# Patient Record
Sex: FEMALE | Race: WHITE | NOT HISPANIC OR LATINO | URBAN - METROPOLITAN AREA
[De-identification: names, ages, dates, MRNs, and addresses within clinical notes are randomized per-mention and may not be internally consistent; named-entity substitution may affect disease eponyms.]

---

## 2023-11-13 ENCOUNTER — HOSPITAL ENCOUNTER (EMERGENCY)
Facility: HOSPITAL | Age: 68
Discharge: HOME | End: 2023-11-13
Attending: EMERGENCY MEDICINE
Payer: MEDICARE

## 2023-11-13 ENCOUNTER — APPOINTMENT (OUTPATIENT)
Dept: RADIOLOGY | Facility: HOSPITAL | Age: 68
End: 2023-11-13
Payer: MEDICARE

## 2023-11-13 VITALS
RESPIRATION RATE: 20 BRPM | BODY MASS INDEX: 27.49 KG/M2 | SYSTOLIC BLOOD PRESSURE: 116 MMHG | OXYGEN SATURATION: 98 % | HEIGHT: 65 IN | HEART RATE: 68 BPM | DIASTOLIC BLOOD PRESSURE: 76 MMHG | WEIGHT: 165 LBS | TEMPERATURE: 98.2 F

## 2023-11-13 DIAGNOSIS — S49.92XA INJURY OF LEFT SHOULDER, INITIAL ENCOUNTER: Primary | ICD-10-CM

## 2023-11-13 PROCEDURE — 2500000004 HC RX 250 GENERAL PHARMACY W/ HCPCS (ALT 636 FOR OP/ED): Performed by: EMERGENCY MEDICINE

## 2023-11-13 PROCEDURE — 73030 X-RAY EXAM OF SHOULDER: CPT | Mod: RT,FY

## 2023-11-13 PROCEDURE — 99285 EMERGENCY DEPT VISIT HI MDM: CPT | Mod: 25 | Performed by: EMERGENCY MEDICINE

## 2023-11-13 PROCEDURE — 73030 X-RAY EXAM OF SHOULDER: CPT | Mod: RIGHT SIDE | Performed by: STUDENT IN AN ORGANIZED HEALTH CARE EDUCATION/TRAINING PROGRAM

## 2023-11-13 RX ORDER — KETOROLAC TROMETHAMINE 15 MG/ML
15 INJECTION, SOLUTION INTRAMUSCULAR; INTRAVENOUS ONCE
Status: COMPLETED | OUTPATIENT
Start: 2023-11-13 | End: 2023-11-13

## 2023-11-13 RX ORDER — METHOCARBAMOL 500 MG/1
500 TABLET, FILM COATED ORAL 4 TIMES DAILY
Qty: 40 TABLET | Refills: 0 | Status: SHIPPED | OUTPATIENT
Start: 2023-11-13 | End: 2023-11-23

## 2023-11-13 RX ORDER — PREDNISONE 50 MG/1
50 TABLET ORAL DAILY
Qty: 5 TABLET | Refills: 0 | Status: SHIPPED | OUTPATIENT
Start: 2023-11-13 | End: 2023-11-18

## 2023-11-13 RX ADMIN — PREDNISONE 60 MG: 50 TABLET ORAL at 22:51

## 2023-11-13 RX ADMIN — KETOROLAC TROMETHAMINE 15 MG: 15 INJECTION, SOLUTION INTRAMUSCULAR; INTRAVENOUS at 22:50

## 2023-11-13 ASSESSMENT — PAIN DESCRIPTION - PAIN TYPE: TYPE: ACUTE PAIN

## 2023-11-13 ASSESSMENT — PAIN DESCRIPTION - ORIENTATION: ORIENTATION: RIGHT

## 2023-11-13 ASSESSMENT — COLUMBIA-SUICIDE SEVERITY RATING SCALE - C-SSRS
1. IN THE PAST MONTH, HAVE YOU WISHED YOU WERE DEAD OR WISHED YOU COULD GO TO SLEEP AND NOT WAKE UP?: NO
2. HAVE YOU ACTUALLY HAD ANY THOUGHTS OF KILLING YOURSELF?: NO
6. HAVE YOU EVER DONE ANYTHING, STARTED TO DO ANYTHING, OR PREPARED TO DO ANYTHING TO END YOUR LIFE?: NO

## 2023-11-13 ASSESSMENT — PAIN DESCRIPTION - LOCATION: LOCATION: SHOULDER

## 2023-11-13 ASSESSMENT — PAIN - FUNCTIONAL ASSESSMENT: PAIN_FUNCTIONAL_ASSESSMENT: 0-10

## 2023-11-13 ASSESSMENT — PAIN SCALES - GENERAL: PAINLEVEL_OUTOF10: 10 - WORST POSSIBLE PAIN

## 2023-11-14 NOTE — ED PROVIDER NOTES
HPI   Chief Complaint   Patient presents with    Shoulder Injury       HX of shoulder injury on August 11.  Was preparing dinner and something popped just below the shoulder and now has severe pain.  Shoulders do not look symmetrical.       68-year-old female here for chief complaint of right shoulder injury.  Patient states she injured her shoulder earlier this year and she was denied an MRI by her insurance company so her doctor gave her some steroid injections.  She states they were working well and she was helping her  rake some leaves today which went very well.  She then had a little bowl of noodles in her hand tonight and she turned her arm and felt a sharp 10 out of 10 pain in her shoulder radiating all the way down her arm.  She dropped the bowl.  She states the pain is not as bad as it was but she is worried she may have damaged something else.  She denies any numbness weakness or tingling currently.    10 point review of systems reviewed and is negative unless otherwise stated                          Irina Coma Scale Score: 15                  Patient History   Past Medical History:   Diagnosis Date    Other conditions influencing health status     No significant past surgical history     Past Surgical History:   Procedure Laterality Date    BLADDER SURGERY  10/13/2014    Bladder Surgery    BREAST SURGERY  09/23/2015    Breast Surgery Reduction Procedure Bilateral    HYSTERECTOMY  10/13/2014    Hysterectomy     No family history on file.  Social History     Tobacco Use    Smoking status: Not on file    Smokeless tobacco: Not on file   Substance Use Topics    Alcohol use: Not on file    Drug use: Not on file       Physical Exam   ED Triage Vitals [11/13/23 2051]   Temp Heart Rate Resp BP   36.3 °C (97.3 °F) 90 20 130/82      SpO2 Temp Source Heart Rate Source Patient Position   96 % Skin Monitor Sitting      BP Location FiO2 (%)     Left arm --       Physical Exam  Vitals and nursing note  reviewed.   Constitutional:       Appearance: Normal appearance.   HENT:      Head: Normocephalic and atraumatic.      Nose: Nose normal.      Mouth/Throat:      Mouth: Mucous membranes are moist.   Eyes:      Extraocular Movements: Extraocular movements intact.      Pupils: Pupils are equal, round, and reactive to light.   Cardiovascular:      Rate and Rhythm: Normal rate and regular rhythm.   Pulmonary:      Effort: Pulmonary effort is normal.      Breath sounds: Normal breath sounds.   Abdominal:      General: Abdomen is flat.      Palpations: Abdomen is soft.   Musculoskeletal:      Cervical back: Normal range of motion.      Comments: She has decreased abduction of the shoulder and the elbow, it hurts to resist when pushing upwards, 2+ pulses,   Skin:     General: Skin is warm and dry.      Capillary Refill: Capillary refill takes less than 2 seconds.   Neurological:      General: No focal deficit present.      Mental Status: She is alert.   Psychiatric:         Mood and Affect: Mood normal.         ED Course & MDM   Diagnoses as of 11/13/23 2311   Injury of left shoulder, initial encounter       Medical Decision Making      Medical Decision Making: Patient was given Toradol and prednisone.  X-ray of the right shoulder was done.  X-ray does not show any acute process.  Patient will be given prednisone and Robaxin for home she already has a sling.  She will be given close orthopedic follow-up with her current orthopedic doctor  [unfilled]     EKG interpreted by myself (ED attending physician): N/A    Differential Diagnoses Considered: Sprain strain tendinitis dislocation fracture    Chronic Medical Conditions Significantly Affecting Care: Chronic shoulder issue    External Records Reviewed: I reviewed recent and relevant outside records including: Previous x-ray    Social Determinants of Health Significantly Affecting Care: Lives with her     Diagnostic testing considered: CT of shoulder but not indicated  at this time    Janay Beebe D.O.  Emergency Medicine          Procedure  Procedures     Janay Beebe,   11/13/23 6436

## 2024-10-03 ENCOUNTER — APPOINTMENT (OUTPATIENT)
Dept: OBSTETRICS AND GYNECOLOGY | Facility: CLINIC | Age: 69
End: 2024-10-03
Payer: MEDICARE

## 2024-10-03 VITALS — WEIGHT: 175 LBS | BODY MASS INDEX: 29.12 KG/M2 | SYSTOLIC BLOOD PRESSURE: 137 MMHG | DIASTOLIC BLOOD PRESSURE: 81 MMHG

## 2024-10-03 DIAGNOSIS — Z12.31 ENCOUNTER FOR SCREENING MAMMOGRAM FOR MALIGNANT NEOPLASM OF BREAST: ICD-10-CM

## 2024-10-03 DIAGNOSIS — M85.871 OTHER SPECIFIED DISORDERS OF BONE DENSITY AND STRUCTURE, RIGHT ANKLE AND FOOT: ICD-10-CM

## 2024-10-03 DIAGNOSIS — Z01.419 WELL WOMAN EXAM WITH ROUTINE GYNECOLOGICAL EXAM: Primary | ICD-10-CM

## 2024-10-03 PROCEDURE — 99397 PER PM REEVAL EST PAT 65+ YR: CPT | Performed by: NURSE PRACTITIONER

## 2024-10-03 PROCEDURE — 1126F AMNT PAIN NOTED NONE PRSNT: CPT | Performed by: NURSE PRACTITIONER

## 2024-10-03 PROCEDURE — 1159F MED LIST DOCD IN RCRD: CPT | Performed by: NURSE PRACTITIONER

## 2024-10-03 PROCEDURE — 1036F TOBACCO NON-USER: CPT | Performed by: NURSE PRACTITIONER

## 2024-10-03 RX ORDER — FAMOTIDINE 10 MG/1
10 TABLET ORAL DAILY
COMMUNITY

## 2024-10-03 RX ORDER — LISINOPRIL AND HYDROCHLOROTHIAZIDE 12.5; 2 MG/1; MG/1
1 TABLET ORAL
COMMUNITY
Start: 2014-09-09

## 2024-10-03 RX ORDER — LORATADINE 10 MG/1
10 TABLET ORAL
COMMUNITY

## 2024-10-03 RX ORDER — LEVOTHYROXINE SODIUM 100 UG/1
100 TABLET ORAL
COMMUNITY
Start: 2014-10-13

## 2024-10-03 RX ORDER — CLOBETASOL PROPIONATE 0.5 MG/G
OINTMENT TOPICAL 2 TIMES DAILY
Qty: 15 G | Refills: 2 | Status: SHIPPED | OUTPATIENT
Start: 2024-10-03 | End: 2025-10-03

## 2024-10-03 SDOH — ECONOMIC STABILITY: FOOD INSECURITY: WITHIN THE PAST 12 MONTHS, YOU WORRIED THAT YOUR FOOD WOULD RUN OUT BEFORE YOU GOT MONEY TO BUY MORE.: NEVER TRUE

## 2024-10-03 SDOH — ECONOMIC STABILITY: FOOD INSECURITY: WITHIN THE PAST 12 MONTHS, THE FOOD YOU BOUGHT JUST DIDN'T LAST AND YOU DIDN'T HAVE MONEY TO GET MORE.: NEVER TRUE

## 2024-10-03 SDOH — ECONOMIC STABILITY: TRANSPORTATION INSECURITY
IN THE PAST 12 MONTHS, HAS LACK OF TRANSPORTATION KEPT YOU FROM MEETINGS, WORK, OR FROM GETTING THINGS NEEDED FOR DAILY LIVING?: NO

## 2024-10-03 SDOH — ECONOMIC STABILITY: TRANSPORTATION INSECURITY
IN THE PAST 12 MONTHS, HAS THE LACK OF TRANSPORTATION KEPT YOU FROM MEDICAL APPOINTMENTS OR FROM GETTING MEDICATIONS?: NO

## 2024-10-03 ASSESSMENT — PATIENT HEALTH QUESTIONNAIRE - PHQ9
SUM OF ALL RESPONSES TO PHQ9 QUESTIONS 1 & 2: 0
1. LITTLE INTEREST OR PLEASURE IN DOING THINGS: NOT AT ALL
2. FEELING DOWN, DEPRESSED OR HOPELESS: NOT AT ALL

## 2024-10-03 ASSESSMENT — ENCOUNTER SYMPTOMS
DEPRESSION: 0
LOSS OF SENSATION IN FEET: 0
OCCASIONAL FEELINGS OF UNSTEADINESS: 0

## 2024-10-03 ASSESSMENT — PAIN SCALES - GENERAL: PAINLEVEL: 0-NO PAIN

## 2024-10-03 NOTE — PROGRESS NOTES
"Margie Acosta, APRN-CNP     Subjective   Evangelina Babin \"Kylee\" is a 69 y.o. female who presents for annual exam.   69-year-old G1, P1 here for her annual exam.  History reviewed and there have not been health changes in the past year.    Patient was previously using clobetasol for areas of lichen sclerosis and she does report that itching occasionally continues but is mostly resolved.  She does still have slight urinary incontinence mostly with increased abdominal pressure.  Many years ago she did have bladder lift surgery with Dr. Cooley and it does not have interest in any further evaluation by urogynecology.  Symptoms:  No Menopausal Symptoms    Past Medical History:   Diagnosis Date    Hypertension      Past Surgical History:   Procedure Laterality Date    BLADDER SURGERY  10/13/2014    Bladder Surgery    BREAST SURGERY  2015    Breast Surgery Reduction Procedure Bilateral    HYSTERECTOMY  10/13/2014    Hysterectomy    THYROIDECTOMY, PARTIAL  2009       OB History          1    Para   1    Term   1            AB        Living   1         SAB        IAB        Ectopic        Multiple        Live Births   1               No LMP recorded (lmp unknown). Patient has had a hysterectomy.    Review of Systems   Genitourinary:         Incontinence     All other systems reviewed and are negative.    Breast: No Complaints   Vaginal: No Complaints slight external itching          Objective   /81   Wt 79.4 kg (175 lb)   LMP  (LMP Unknown)   BMI 29.12 kg/m²   Physical Exam  Constitutional:       Appearance: She is normal weight.   Genitourinary:      Urethral meatus normal.      Right Labia: skin changes.      Right Labia: No rash.     Left Labia: skin changes.      Left Labia: No rash.             Right Adnexa: no mass present.     Left Adnexa: no mass present.     Cervix is not absent.      Uterus is not absent.  Breasts:     Right: Normal.      Left: Normal.   HENT:      Head: " Normocephalic.   Cardiovascular:      Rate and Rhythm: Normal rate and regular rhythm.   Abdominal:      Palpations: Abdomen is soft.   Musculoskeletal:         General: Normal range of motion.      Cervical back: Normal range of motion.   Neurological:      Mental Status: She is alert.   Psychiatric:         Mood and Affect: Mood normal.         Behavior: Behavior normal.                Assessment/Plan   Problem List Items Addressed This Visit    None  Visit Diagnoses         Codes    Well woman exam with routine gynecological exam    -  Primary Z01.419    Relevant Medications    clobetasol (Temovate) 0.05 % ointment    Other Relevant Orders    BI mammo bilateral screening tomosynthesis    XR DEXA bone density    Encounter for screening mammogram for malignant neoplasm of breast     Z12.31    Relevant Orders    BI mammo bilateral screening tomosynthesis    Other specified disorders of bone density and structure, right ankle and foot     M85.871    Relevant Orders    XR DEXA bone density

## 2024-11-06 ENCOUNTER — HOSPITAL ENCOUNTER (OUTPATIENT)
Dept: RADIOLOGY | Facility: CLINIC | Age: 69
Discharge: HOME | End: 2024-11-06
Payer: MEDICARE

## 2024-11-06 VITALS — BODY MASS INDEX: 29.16 KG/M2 | WEIGHT: 175.04 LBS | HEIGHT: 65 IN

## 2024-11-06 DIAGNOSIS — Z01.419 WELL WOMAN EXAM WITH ROUTINE GYNECOLOGICAL EXAM: ICD-10-CM

## 2024-11-06 DIAGNOSIS — Z12.31 ENCOUNTER FOR SCREENING MAMMOGRAM FOR MALIGNANT NEOPLASM OF BREAST: ICD-10-CM

## 2024-11-06 PROCEDURE — 77067 SCR MAMMO BI INCL CAD: CPT

## 2024-11-07 ENCOUNTER — HOSPITAL ENCOUNTER (OUTPATIENT)
Dept: RADIOLOGY | Facility: HOSPITAL | Age: 69
Discharge: HOME | End: 2024-11-07
Payer: MEDICARE

## 2024-11-07 DIAGNOSIS — Z01.419 WELL WOMAN EXAM WITH ROUTINE GYNECOLOGICAL EXAM: ICD-10-CM

## 2024-11-07 DIAGNOSIS — M85.871 OTHER SPECIFIED DISORDERS OF BONE DENSITY AND STRUCTURE, RIGHT ANKLE AND FOOT: ICD-10-CM

## 2024-11-07 PROCEDURE — 77080 DXA BONE DENSITY AXIAL: CPT

## 2024-12-31 ENCOUNTER — OFFICE VISIT (OUTPATIENT)
Dept: URGENT CARE | Age: 69
End: 2024-12-31
Payer: MEDICARE

## 2024-12-31 VITALS
SYSTOLIC BLOOD PRESSURE: 156 MMHG | DIASTOLIC BLOOD PRESSURE: 76 MMHG | WEIGHT: 175 LBS | HEART RATE: 77 BPM | TEMPERATURE: 98.1 F | BODY MASS INDEX: 29.12 KG/M2 | OXYGEN SATURATION: 96 % | RESPIRATION RATE: 18 BRPM

## 2024-12-31 DIAGNOSIS — J01.40 ACUTE PANSINUSITIS, RECURRENCE NOT SPECIFIED: Primary | ICD-10-CM

## 2024-12-31 DIAGNOSIS — J02.9 SORE THROAT: ICD-10-CM

## 2024-12-31 LAB
POC RAPID INFLUENZA A: NEGATIVE
POC RAPID INFLUENZA B: NEGATIVE
POC RAPID STREP: NEGATIVE
POC SARS-COV-2 AG BINAX: NORMAL

## 2024-12-31 PROCEDURE — 99204 OFFICE O/P NEW MOD 45 MIN: CPT | Performed by: SPECIALIST

## 2024-12-31 PROCEDURE — 87804 INFLUENZA ASSAY W/OPTIC: CPT | Performed by: SPECIALIST

## 2024-12-31 PROCEDURE — 87880 STREP A ASSAY W/OPTIC: CPT | Performed by: SPECIALIST

## 2024-12-31 PROCEDURE — 87811 SARS-COV-2 COVID19 W/OPTIC: CPT | Performed by: SPECIALIST

## 2024-12-31 PROCEDURE — 1125F AMNT PAIN NOTED PAIN PRSNT: CPT | Performed by: SPECIALIST

## 2024-12-31 RX ORDER — AMOXICILLIN AND CLAVULANATE POTASSIUM 875; 125 MG/1; MG/1
1 TABLET, FILM COATED ORAL 2 TIMES DAILY
Qty: 14 TABLET | Refills: 0 | Status: SHIPPED | OUTPATIENT
Start: 2024-12-31 | End: 2025-01-07

## 2024-12-31 ASSESSMENT — ENCOUNTER SYMPTOMS
RHINORRHEA: 1
COUGH: 1
SINUS PAIN: 1
SORE THROAT: 1
FATIGUE: 1

## 2024-12-31 ASSESSMENT — PAIN SCALES - GENERAL: PAINLEVEL_OUTOF10: 6

## 2024-12-31 NOTE — PROGRESS NOTES
"Subjective   Patient ID: Evangelina Babin \"Maranda" is a 69 y.o. female. They present today with a chief complaint of Cough, Sore Throat, and Fever.    History of Present Illness    Cough  Associated symptoms include rhinorrhea and a sore throat.   Sore Throat   Associated symptoms include coughing.   Fever   Associated symptoms include coughing and a sore throat.       Past Medical History  Allergies as of 12/31/2024    (No Known Allergies)       (Not in a hospital admission)       Past Medical History:   Diagnosis Date    Hypertension        Past Surgical History:   Procedure Laterality Date    BLADDER SURGERY  10/13/2014    Bladder Surgery    BREAST BIOPSY Right     excisional bx, benign    BREAST SURGERY  09/23/2015    Breast Surgery Reduction Procedure Bilateral    HYSTERECTOMY  10/13/2014    Hysterectomy    THYROIDECTOMY, PARTIAL  11/2009        reports that she has never smoked. She has never used smokeless tobacco. She reports current alcohol use. She reports that she does not use drugs.    Review of Systems  Review of Systems   Constitutional:  Positive for fatigue.   HENT:  Positive for rhinorrhea, sinus pain and sore throat.    Respiratory:  Positive for cough.                                   Objective    Vitals:    12/31/24 1206   BP: 156/76   BP Location: Left arm   Patient Position: Sitting   Pulse: 77   Resp: 18   Temp: 36.7 °C (98.1 °F)   TempSrc: Oral   SpO2: 96%   Weight: 79.4 kg (175 lb)     No LMP recorded (lmp unknown). Patient has had a hysterectomy.    Physical Exam  Constitutional:       Appearance: Normal appearance.   HENT:      Head:      Comments: Multiple sinus tenderness     Nose: Congestion present.   Pulmonary:      Effort: Pulmonary effort is normal.      Breath sounds: Normal breath sounds and air entry.   Neurological:      Mental Status: She is alert.         Procedures    Point of Care Test & Imaging Results from this visit  Results for orders placed or performed in visit on " 12/31/24   POCT Covid-19 Rapid Antigen   Result Value Ref Range    POC JENNIFER-COV-2 AG  Presumptive negative test for SARS-CoV-2 (no antigen detected)     Presumptive negative test for SARS-CoV-2 (no antigen detected)   POCT Influenza A/B manually resulted   Result Value Ref Range    POC Rapid Influenza A Negative Negative    POC Rapid Influenza B Negative Negative   POCT rapid strep A manually resulted   Result Value Ref Range    POC Rapid Strep Negative Negative      No results found.    Diagnostic study results (if any) were reviewed by Yodit Narvaez MD.    Assessment/Plan   Allergies, medications, history, and pertinent labs/EKGs/Imaging reviewed by Yodit Narvaez MD.     Medical Decision Making      Orders and Diagnoses  Diagnoses and all orders for this visit:  Sore throat  -     POCT Covid-19 Rapid Antigen  -     POCT Influenza A/B manually resulted  -     POCT rapid strep A manually resulted      Medical Admin Record      Patient disposition: Home    Electronically signed by Yodit Narvaez MD  1:15 PM

## 2025-05-04 ENCOUNTER — OFFICE VISIT (OUTPATIENT)
Dept: URGENT CARE | Age: 70
End: 2025-05-04
Payer: MEDICARE

## 2025-05-04 VITALS
OXYGEN SATURATION: 98 % | TEMPERATURE: 98.6 F | SYSTOLIC BLOOD PRESSURE: 148 MMHG | HEART RATE: 71 BPM | DIASTOLIC BLOOD PRESSURE: 82 MMHG | RESPIRATION RATE: 16 BRPM

## 2025-05-04 DIAGNOSIS — J01.90 ACUTE NON-RECURRENT SINUSITIS, UNSPECIFIED LOCATION: Primary | ICD-10-CM

## 2025-05-04 DIAGNOSIS — H65.93 MIDDLE EAR EFFUSION, BILATERAL: ICD-10-CM

## 2025-05-04 PROBLEM — I10 HTN (HYPERTENSION): Status: ACTIVE | Noted: 2025-05-04

## 2025-05-04 PROCEDURE — 99070 SPECIAL SUPPLIES PHYS/QHP: CPT | Performed by: NURSE PRACTITIONER

## 2025-05-04 PROCEDURE — 3079F DIAST BP 80-89 MM HG: CPT | Performed by: NURSE PRACTITIONER

## 2025-05-04 PROCEDURE — 1036F TOBACCO NON-USER: CPT | Performed by: NURSE PRACTITIONER

## 2025-05-04 PROCEDURE — 1160F RVW MEDS BY RX/DR IN RCRD: CPT | Performed by: NURSE PRACTITIONER

## 2025-05-04 PROCEDURE — 1159F MED LIST DOCD IN RCRD: CPT | Performed by: NURSE PRACTITIONER

## 2025-05-04 PROCEDURE — 3077F SYST BP >= 140 MM HG: CPT | Performed by: NURSE PRACTITIONER

## 2025-05-04 PROCEDURE — 99213 OFFICE O/P EST LOW 20 MIN: CPT | Performed by: NURSE PRACTITIONER

## 2025-05-04 RX ORDER — AMOXICILLIN AND CLAVULANATE POTASSIUM 875; 125 MG/1; MG/1
875 TABLET, FILM COATED ORAL 2 TIMES DAILY
Qty: 20 TABLET | Refills: 0 | Status: SHIPPED | OUTPATIENT
Start: 2025-05-04

## 2025-05-04 ASSESSMENT — ENCOUNTER SYMPTOMS
SORE THROAT: 0
ARTHRALGIAS: 0
FATIGUE: 1
APPETITE CHANGE: 0
HEADACHES: 1
FEVER: 1
ACTIVITY CHANGE: 0
SINUS PAIN: 1
ABDOMINAL PAIN: 0
COUGH: 1
SHORTNESS OF BREATH: 0
VOMITING: 0
SINUS COMPLAINT: 1
WHEEZING: 0
RHINORRHEA: 0
PHOTOPHOBIA: 0
DIARRHEA: 0
CHEST TIGHTNESS: 0
MYALGIAS: 0
NAUSEA: 0
DIZZINESS: 0
SINUS PRESSURE: 1

## 2025-05-04 NOTE — PATIENT INSTRUCTIONS
Thank you for letting me care for you today.  You have been seen today for a sinus infection.  Finish entire course of Augmentin.  Start Flonase.  Please follow up with your primary care provider/pediatrician as directed.   If one is needed, please call [phone].  Please seek care in emergency room for red flags as discussed during visit.

## 2025-05-04 NOTE — PROGRESS NOTES
"Subjective   Patient ID: Evangelina Babin \"Maranda" is a 69 y.o. female. They present today with a chief complaint of Cough, Sinus Problem, and Earache (Pt presents with a cough, sinus and ear pain for the past 2 weeks pt had covid April 10th. ).    History of Present Illness  69-year-old female seen today for evaluation of URI symptoms.  Patient states she tested positive for COVID on April 10.   has leukemia.  PCP prescribed Paxlovid for patient.  Patient states she was completely asymptomatic when she finished the Paxlovid.  COVID symptoms were cough and bodyaches.  Patient states she was feeling better until about 2 weeks ago when cough returned, nasal congestion, sinus pressure, headache, fatigue.  Patient states she feels like she is keeps getting better and then gets worse.  Has had a fever over the last few days.  States ears started bothering her yesterday and are worsening today.  Denies chest pain, shortness of breath, dyspnea on exertion, wheezing, orthopnea, pedal edema.  Denies change in bowel or bladder function.  Denies GI complaints.  Has been taking Tylenol.  Denies one-sided weakness, slurred speech, facial droop, vision changes, and worst headache of life/thunderclap onset.      Cough  Associated symptoms include ear pain, a fever and headaches. Pertinent negatives include no chest pain, myalgias, rash, rhinorrhea, sore throat, shortness of breath or wheezing.   Sinus Problem  Associated symptoms: congestion, cough, ear pain, fatigue, fever and headaches    Associated symptoms: no abdominal pain, no chest pain, no diarrhea, no myalgias, no nausea, no rash, no rhinorrhea, no shortness of breath, no sore throat, no vomiting and no wheezing    Earache   Associated symptoms include coughing and headaches. Pertinent negatives include no abdominal pain, diarrhea, rash, rhinorrhea, sore throat or vomiting.       Past Medical History  Allergies as of 05/04/2025    (No Known Allergies) "       Prescriptions Prior to Admission[1]     Medical History[2]    Surgical History[3]     reports that she has never smoked. She has never been exposed to tobacco smoke. She has never used smokeless tobacco. She reports current alcohol use. She reports that she does not use drugs.    Review of Systems  Review of Systems   Constitutional:  Positive for fatigue and fever. Negative for activity change and appetite change.   HENT:  Positive for congestion, ear pain, sinus pressure and sinus pain. Negative for rhinorrhea and sore throat.    Eyes:  Negative for photophobia and visual disturbance.   Respiratory:  Positive for cough. Negative for chest tightness, shortness of breath and wheezing.    Cardiovascular:  Negative for chest pain and leg swelling.   Gastrointestinal:  Negative for abdominal pain, diarrhea, nausea and vomiting.   Musculoskeletal:  Negative for arthralgias and myalgias.   Skin:  Negative for rash.   Neurological:  Positive for headaches. Negative for dizziness.   All other systems reviewed and are negative.                                 Objective    Vitals:    05/04/25 1529   BP: 148/82   BP Location: Left arm   Patient Position: Sitting   Pulse: 71   Resp: 16   Temp: 37 °C (98.6 °F)   SpO2: 98%     No LMP recorded (lmp unknown). Patient has had a hysterectomy.    Physical Exam  Vitals reviewed.   Constitutional:       Appearance: Normal appearance. She is ill-appearing.   HENT:      Head: Normocephalic and atraumatic.      Right Ear: Ear canal and external ear normal. A middle ear effusion is present. Tympanic membrane is bulging.      Left Ear: Ear canal and external ear normal. A middle ear effusion is present. Tympanic membrane is bulging.      Nose:      Right Sinus: Maxillary sinus tenderness and frontal sinus tenderness present.      Left Sinus: Maxillary sinus tenderness and frontal sinus tenderness present.      Mouth/Throat:      Mouth: Mucous membranes are moist.      Pharynx:  Oropharynx is clear.   Eyes:      Extraocular Movements: Extraocular movements intact.      Conjunctiva/sclera: Conjunctivae normal.      Pupils: Pupils are equal, round, and reactive to light.   Cardiovascular:      Rate and Rhythm: Normal rate and regular rhythm.      Pulses: Normal pulses.      Heart sounds: Normal heart sounds.   Pulmonary:      Effort: Pulmonary effort is normal.      Breath sounds: Normal breath sounds.   Musculoskeletal:         General: Normal range of motion.      Cervical back: Normal range of motion and neck supple.   Skin:     General: Skin is warm and dry.      Capillary Refill: Capillary refill takes less than 2 seconds.   Neurological:      General: No focal deficit present.      Mental Status: She is alert and oriented to person, place, and time.         Procedures    Point of Care Test & Imaging Results from this visit  No results found for this visit on 05/04/25.   Imaging  No results found.    Cardiology, Vascular, and Other Imaging  No other imaging results found for the past 2 days      Diagnostic study results (if any) were reviewed by Kristin L Schoenlein, APRN-CNP.    Assessment/Plan   Allergies, medications, history, and pertinent labs/EKGs/Imaging reviewed by Kristin L Schoenlein, APRN-CNP.     Medical Decision Making  Patient acutely ill-appearing, however, VSS, NAD, Nontoxic appearing.  Physical exam as document above    Symptoms, history, and exam are consistent with: Bacterial sinusitis and b/l middle ear effusions.  Placed patient on Augmentin and encouraged her to start Flonase.    Differential Dx include, however, are not limited to: Viral illness, nasopharyngitis, allergic rhinitis, otitis media, deep tissue infection    I have a low suspicion for any acute pathologies requiring emergent evaluation and further workup at this time.  I believe patient is safe to discharge home with a low threshold for emergency room as discussed during visit.  We discussed close  follow-up with primary care provider/pediatrician. Supportive care discussed.  Medication(s) profile of OTC and Rxed medication(s) if prescribed was (were) reviewed.  All questions answered and addressed.  Patient verbalized understanding.      Orders and Diagnoses  Diagnoses and all orders for this visit:  Acute non-recurrent sinusitis, unspecified location  -     amoxicillin-clavulanate (Augmentin) 875-125 mg tablet; Take 1 tablet (875 mg) by mouth 2 times a day.  Middle ear effusion, bilateral      Medical Admin Record      Patient disposition: Home    Electronically signed by Kristin L Schoenlein, APRN-CNP  6:58 PM           [1] (Not in a hospital admission)   [2]   Past Medical History:  Diagnosis Date    Hypertension    [3]   Past Surgical History:  Procedure Laterality Date    BLADDER SURGERY  10/13/2014    Bladder Surgery    BREAST BIOPSY Right     excisional bx, benign    BREAST SURGERY  09/23/2015    Breast Surgery Reduction Procedure Bilateral    HYSTERECTOMY  10/13/2014    Hysterectomy    THYROIDECTOMY, PARTIAL  11/2009